# Patient Record
Sex: MALE | Race: WHITE | Employment: PART TIME | URBAN - METROPOLITAN AREA
[De-identification: names, ages, dates, MRNs, and addresses within clinical notes are randomized per-mention and may not be internally consistent; named-entity substitution may affect disease eponyms.]

---

## 2020-11-03 ENCOUNTER — OFFICE VISIT (OUTPATIENT)
Dept: FAMILY MEDICINE | Facility: CLINIC | Age: 30
End: 2020-11-03
Payer: COMMERCIAL

## 2020-11-03 VITALS
RESPIRATION RATE: 16 BRPM | SYSTOLIC BLOOD PRESSURE: 122 MMHG | HEART RATE: 59 BPM | BODY MASS INDEX: 20.33 KG/M2 | OXYGEN SATURATION: 100 % | WEIGHT: 142 LBS | HEIGHT: 70 IN | DIASTOLIC BLOOD PRESSURE: 76 MMHG | TEMPERATURE: 98.6 F

## 2020-11-03 DIAGNOSIS — Z23 NEED FOR PROPHYLACTIC VACCINATION AND INOCULATION AGAINST INFLUENZA: ICD-10-CM

## 2020-11-03 DIAGNOSIS — R10.2 PELVIC PAIN IN MALE: Primary | ICD-10-CM

## 2020-11-03 LAB
ALBUMIN UR-MCNC: NEGATIVE MG/DL
APPEARANCE UR: CLEAR
BILIRUB UR QL STRIP: NEGATIVE
COLOR UR AUTO: YELLOW
GLUCOSE UR STRIP-MCNC: NEGATIVE MG/DL
HGB UR QL STRIP: NEGATIVE
KETONES UR STRIP-MCNC: NEGATIVE MG/DL
LEUKOCYTE ESTERASE UR QL STRIP: NEGATIVE
NITRATE UR QL: NEGATIVE
PH UR STRIP: 8 PH (ref 5–7)
SOURCE: ABNORMAL
SP GR UR STRIP: 1.01 (ref 1–1.03)
UROBILINOGEN UR STRIP-ACNC: 0.2 EU/DL (ref 0.2–1)

## 2020-11-03 PROCEDURE — 90686 IIV4 VACC NO PRSV 0.5 ML IM: CPT | Performed by: PHYSICIAN ASSISTANT

## 2020-11-03 PROCEDURE — 87591 N.GONORRHOEAE DNA AMP PROB: CPT | Performed by: PHYSICIAN ASSISTANT

## 2020-11-03 PROCEDURE — 99203 OFFICE O/P NEW LOW 30 MIN: CPT | Mod: 25 | Performed by: PHYSICIAN ASSISTANT

## 2020-11-03 PROCEDURE — 87491 CHLMYD TRACH DNA AMP PROBE: CPT | Performed by: PHYSICIAN ASSISTANT

## 2020-11-03 PROCEDURE — 81003 URINALYSIS AUTO W/O SCOPE: CPT | Performed by: PHYSICIAN ASSISTANT

## 2020-11-03 PROCEDURE — 90471 IMMUNIZATION ADMIN: CPT | Performed by: PHYSICIAN ASSISTANT

## 2020-11-03 ASSESSMENT — MIFFLIN-ST. JEOR: SCORE: 1625.36

## 2020-11-03 NOTE — PROGRESS NOTES
Subjective     Ajay Duron is a 29 year old male who presents to clinic today for the following health issues:    HPI         Abdominal/Flank Pain  Onset/Duration: ongoing for over a year, noticed again about a month ago. Concern, dad passed recently due to prostate cancer. He was diagnosed at 61.    Description:   Character: discomfort, sharp pain lasting seconds. States sometimes feels like sitting on something but no external lesions currently- had hemorroid a few years ago.   Last year was having severe intermittent rectal pain.  This resolved.    Location: pelvic region, bladder,   Radiation: Back  Intensity: moderate  Progression of Symptoms:  worsening  Accompanying Signs & Symptoms:  Fever/Chills: no  Gas/Bloating: no  Nausea: no  Vomitting: no  Diarrhea: no, but  Will have formed stools 2-3 every morning.    Constipation: no  Dysuria or Hematuria: no  History:   Trauma: no  Previous similar pain: YES  Previous tests done: none, patient states seen about a year ago, and no scans or x ray done.   Precipitating factors:   Does the pain change with:     Food: no    Bowel Movement: no    Urination: no   Other factors:  no  Therapies tried and outcome: tylenol    Outputs intact.  Never had colonoscopy.  No testicular pain.   No rashes.                    Allergies   Allergen Reactions     Sulfa Drugs Hives and Rash       There is no problem list on file for this patient.      History reviewed. No pertinent past medical history.    No current outpatient medications on file prior to visit.  No current facility-administered medications on file prior to visit.       Social History     Tobacco Use     Smoking status: Never Smoker     Smokeless tobacco: Never Used   Substance Use Topics     Alcohol use: Yes     Drug use: Yes     Types: Marijuana       Family History   Problem Relation Age of Onset     Diabetes Mother      Hypertension Mother      Prostate Cancer Father      Hypertension Father      Asthma Brother  "         ROS:  General: negative for fever     GI: as above  : negative for dysuria   SKIN no rashes    OBJECTIVE:  /76 (BP Location: Left arm, Patient Position: Sitting, Cuff Size: Adult Regular)   Pulse 59   Temp 98.6  F (37  C) (Tympanic)   Resp 16   Ht 1.778 m (5' 10\")   Wt 64.4 kg (142 lb)   SpO2 100%   BMI 20.37 kg/m     General:   awake, alert, and cooperative.  NAD.   Head: Normocephalic, atraumatic.  Eyes: Conjunctiva clear, non icteric.   RESP: Regular rate  ABD: soft, no tenderness to palpation , no rigidity, guarding or rebound , bowel sounds intact  Neuro: Alert and oriented - normal speech.     ASSESSMENT:well appearing, will refer to URO pending labs    ICD-10-CM    1. Pelvic pain in male  R10.2 UA reflex to Microscopic and Culture     Chlamydia trachomatis PCR     Neisseria gonorrhoeae PCR   2. Need for prophylactic vaccination and inoculation against influenza  Z23 INFLUENZA VACCINE IM > 6 MONTHS VALENT IIV4 [60924]           PLAN:         Advised about symptoms which might herald more serious problems.                 "

## 2020-11-03 NOTE — PATIENT INSTRUCTIONS
At [facility], we strive to deliver an exceptional experience to you, every time we see you. If you receive a survey, please complete it as we do value your feedback.  If you have MyChart, you can expect to receive results automatically within 24 hours of their completion.  Your provider will send a note interpreting your results as well.   If you do not have MyChart, you should receive your results in about a week by mail.    Your care team:                            Family Medicine Internal Medicine   MD Ezequiel Lainez MD Shantel Branch-Fleming, MD Srinivasa Vaka, MD Katya Georgiev PA-C  Nancy Seay, APRN CNP    Carlos Aparicio, MD Pediatrics   Roland Zazueta, PAMairaC  Velvet Vega, CNP MD Becky Guerin APRN CNP   MD Dolores Quintero MD Deborah Mielke, MD Makenna Patterson, APRN CNP  Sonia Mina PAMairaC  aKrla Haines, CNP  MD Padmini Pham MD Angela Wermerskirchen, MD      Clinic hours: Monday - Thursday 7 am-7 pm; Fridays 7 am-5 pm.   Urgent care: Monday - Friday 11 am-9 pm; Saturday and Sunday 9 am-5 pm.    Clinic: [phone]       [facility]: Monday - Thursday 8 am - 7 pm; Friday 8 am - 6 pm  [facility]: [phone]     Use www.[email or web address].org for 24/7 diagnosis and treatment of dozens of conditions.    Patient Education     Abdominal Pain  Abdominal pain is pain in the stomach or belly area. Everyone has this pain from time to time. In many cases it goes away on its own. But abdominal pain can sometimes be due to a serious problem, such as appendicitis. So it s important to know when to get help.     Causes of abdominal pain   There are many possible causes of abdominal pain. Common causes in adults include:    Constipation, diarrhea, or gas    Stomach acid flowing back up into the esophagus (acid reflux or heartburn)    Severe acid reflux, called GERD  (gastroesophageal reflux disease)    A sore in the lining of the stomach or small intestine (peptic ulcer)    Inflammation of the gallbladder, liver, or pancreas    Gallstones or kidney stones    Appendicitis     Intestinal blockage     An internal organ pushing through a muscle or other tissue (hernia)    Urinary tract infections    In women, menstrual cramps, fibroids, ovarian cysts, pelvic inflammatory disease, or endometriosis    Inflammation or infection of the intestines, including Crohn's disease and ulcerative colitis    Irritable bowel syndrome  Diagnosing the cause of abdominal pain   Your healthcare provider will give you a physical exam help find the cause of your pain. If needed, you will have tests. Belly pain has many possible causes. So it can be hard to find the reason for your pain. Giving details about your pain can help. Tell your provider where and when you feel the pain, and what makes it better or worse. Also let your provider know if you have other symptoms such as:     Fever    Tiredness    Upset stomach (nausea)    Vomiting    Changes in bathroom habits    Blood in the stool or black, tarry stool    Weight loss that you can't explain (involuntary weight loss?)  Also report any family history of stomach or intestinal problems, or cancers. Tell your provider about all your alcohol use and drug use. Tell your provider about all medicines you use, including herbs, vitamins, and supplements.   Treating abdominal pain   Some causes of pain need emergency medical treatment right away. These include appendicitis or a bowel blockage. Other problems can be treated with rest, fluids, or medicines. Your healthcare provider can give you specific instructions for treatment or self-care based on what is causing your pain.      If you have vomiting or diarrhea, sip water or other clear fluids. When you are ready to eat solid foods again, start with small amounts of easy-to-digest, low-fat foods. These  include apple sauce, toast, or crackers.   When to get medical care   Call 911 or go to the hospital right away if you:     Can t pass stool and are vomiting    Are vomiting blood or have bloody diarrhea or black, tarry diarrhea    Have chest, neck, or shoulder pain    Feel like you might pass out    Have pain in your shoulder blades with nausea    Have sudden, severe belly pain    Have new, severe pain unlike any you have felt before    Have a belly that is rigid, hard, and hurts to touch  Call your healthcare provider if you have:     Pain for more than 5 days    Bloating for more than 2 days    Diarrhea for more than 5 days    A fever of 100.4 F (38 C) or higher, or as directed by your healthcare provider    Pain that gets worse    Weight loss for no reason    Continued lack of appetite    Blood in your stool  How to prevent abdominal pain   Here are some tips to help prevent abdominal pain:     Eat smaller amounts of food at each meal.    Don't eat greasy, fried, or other high-fat foods.    Don't eat foods that give you gas.    Exercise regularly.    Drink plenty of fluids.  To help prevent GERD symptoms:     Quit smoking.    Reduce alcohol and foods that increase stomach acid.    Don't use aspirin or over-the-counter pain and fever medicines, if possible. This includes nonsteroidal anti-inflammatory drugs (NSAIDs).    Lose excess weight.    Finish eating at least 2 hours before you go to bed or lie down.    Raise the head of your bed.  [employer] last reviewed this educational content on 4/1/2019 2000-2020 [employer]. [address] [place] [address], [place] [address]. All rights reserved. This information is not intended as a substitute for professional medical care. Always follow your healthcare professional's instructions.

## 2020-11-04 ENCOUNTER — TELEPHONE (OUTPATIENT)
Dept: FAMILY MEDICINE | Facility: CLINIC | Age: 30
End: 2020-11-04

## 2020-11-04 DIAGNOSIS — R10.2 PELVIC PAIN IN MALE: Primary | ICD-10-CM

## 2020-11-04 LAB
C TRACH DNA SPEC QL NAA+PROBE: NEGATIVE
N GONORRHOEA DNA SPEC QL NAA+PROBE: NEGATIVE
SPECIMEN SOURCE: NORMAL
SPECIMEN SOURCE: NORMAL

## 2020-11-06 NOTE — TELEPHONE ENCOUNTER
MEDICAL RECORDS REQUEST   [facility]  Urology Clinic  [address] [place] [address]  PHONE: [phone]  Fax: [phone]        FUTURE VISIT INFORMATION                                                   Ajay Duron, : 1990 scheduled for future visit at [facility]    APPOINTMENT INFORMATION:    Date: 11/10/2020    Provider:  СВЕТЛАНА HERNANDEZ    Reason for Visit/Diagnosis: PELVIC PAIN    REFERRAL INFORMATION:    Referring provider:  RADHA CLANCY    Specialty: PA    Referring providers clinic:  [facility] contact number:  [phone]    RECORDS REQUESTED FOR VISIT                                                     NOTES  STATUS/DETAILS   OFFICE NOTE from referring provider  yes   2020   OFFICE NOTE from other specialist  no   DISCHARGE SUMMARY from hospital  no   DISCHARGE REPORT from the ER  no   OPERATIVE REPORT  no   MEDICATION LIST  yes   LABS     URINALYSIS (UA)  yes   2020     PRE-VISIT CHECKLIST      Record collection complete Yes   Appointment appropriately scheduled           (right time/right provider) Yes   MyChart activation Yes   Questionnaire complete If no, please explain IN PROCESS     Completed by: Josephine Uribe

## 2020-11-10 ENCOUNTER — PRE VISIT (OUTPATIENT)
Dept: UROLOGY | Facility: CLINIC | Age: 30
End: 2020-11-10

## 2020-11-10 ENCOUNTER — VIRTUAL VISIT (OUTPATIENT)
Dept: UROLOGY | Facility: CLINIC | Age: 30
End: 2020-11-10
Attending: PHYSICIAN ASSISTANT
Payer: COMMERCIAL

## 2020-11-10 DIAGNOSIS — Z80.42 FAMILY HISTORY OF PROSTATE CANCER: ICD-10-CM

## 2020-11-10 DIAGNOSIS — R10.2 PELVIC PAIN IN MALE: Primary | ICD-10-CM

## 2020-11-10 DIAGNOSIS — K62.89 RECTAL PAIN: ICD-10-CM

## 2020-11-10 DIAGNOSIS — N41.0 PROSTATITIS, ACUTE: ICD-10-CM

## 2020-11-10 PROCEDURE — 99203 OFFICE O/P NEW LOW 30 MIN: CPT | Mod: 95 | Performed by: PHYSICIAN ASSISTANT

## 2020-11-10 RX ORDER — LEVOFLOXACIN 500 MG/1
500 TABLET, FILM COATED ORAL DAILY
Qty: 10 TABLET | Refills: 0 | Status: SHIPPED | OUTPATIENT
Start: 2020-11-10 | End: 2021-08-03

## 2020-11-10 NOTE — PATIENT INSTRUCTIONS
UROLOGY CLINIC VISIT PATIENT INSTRUCTIONS    Someone will contact you to schedule a lab appointment for a blood draw (PSA).     Start taking the antibiotic (levofloxacin) 500 mg once daily for 10 days.    Continue to take ibuprofen as needed and use a donut pillow when seated for prolonged periods of time to relieve pressure on the area.     Send an update through [employer] after you complete antibiotics to let me know how things are going. If you do not have improvement, we will consider referring you to colorectal surgery.     If you have any issues, questions or concerns in the meantime, do not hesitate to contact us at [phone] or via [employer].     It was a pleasure meeting with you today.  Thank you for allowing me and my team the privilege of caring for you today.  YOU are the reason we are here, and I truly hope we provided you with the excellent service you deserve.  Please let us know if there is anything else we can do for you so that we can be sure you are leaving completely satisfied with your care experience.

## 2020-11-10 NOTE — LETTER
"11/10/2020       RE: Ajay Duron  [address]  [facility] [address]     Dear Colleague,    Thank you for referring your patient, Ajay Duron, to the [facility]OLOGY [facility] [place] at [facility]. Please see a copy of my visit note below.    Ajay Duron is a 30 year old male who is being evaluated via a billable video visit.      The patient has been notified of following:     \"This video visit will be conducted via a call between you and your physician/provider. We have found that certain health care needs can be provided without the need for an in-person physical exam.  This service lets us provide the care you need with a video conversation.  If a prescription is necessary we can send it directly to your pharmacy.  If lab work is needed we can place an order for that and you can then stop by our lab to have the test done at a later time.    Video visits are billed at different rates depending on your insurance coverage.  Please reach out to your insurance provider with any questions.    If during the course of the call the physician/provider feels a video visit is not appropriate, you will not be charged for this service.\"    Patient has given verbal consent for Video visit? Yes  How would you like to obtain your AVS? MyChart  If you are dropped from the video visit, the video invite should be resent to: Send to e-mail at: [email or web address]  Will anyone else be joining your video visit? No      Urology Virtual Visit - Consultation    Name: Ajay Duron    MRN: [id number]   YOB: 1990                 Chief Complaint:   Pelvic pain          History of Present Illness:   Mr. Ajay Duron is a 30 year old male seen in consultation from Dontae Zazueta PA-C for evaluation of pelvic pain. About 1 year ago, he experienced stabbing rectal pains that eventually resolved. Noticed similar symptoms about 1 month ago. He " "describes brief, sharp \"pinching\" pain that lasts for \"a split second\" and then resolves. He feels this in the rectal area primarily, but also describes a feeling of \"tightness\" in the bladder. These symptoms wax and wane with some good days and some bad days. Previously, he also had a sensation like he was sitting on something, but this has since improved. 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 Recently bought a new stool that helps to relieve pressure on this area which has helped some. Describes a history of external hemorrhoids a few years ago, but has not felt any hemorrhoids or other lumps associated with current symptoms.     He denies any pain with urination, gross hematuria, change in the urinary stream, hesitancy, feeling of incomplete bladder emptying, or needing to strain to void. Also denies pain with defecation or blood in the stool. Notes that he has been drinking more water and stopped drinking coffee a few weeks ago. No concern for STI's and had negative gonorrhea/chlamydia testing last week. Urinalysis at that time was also negative.    Also of note, patient's father recently passed from prostate cancer. He was diagnosed in his early 60's. Patient notes that he has been worried about the possibility that his symptoms represent something similar.          Past Medical History:   No past medical history on file.         Past Surgical History:   No past surgical history on file.         Social History:     Social History     Tobacco Use     Smoking status: Never Smoker     Smokeless tobacco: Never Used   Substance Use Topics     Alcohol use: Yes            Family History:     Family History   Problem Relation Age of Onset     Diabetes Mother      Hypertension Mother      Prostate Cancer Father      Hypertension Father      Asthma Brother               Allergies:     Allergies   Allergen Reactions     Sulfa Drugs Hives and Rash            Medications:     Current Outpatient Medications "   Medication Sig     levofloxacin (LEVAQUIN) 500 MG tablet Take 1 tablet (500 mg) by mouth daily     No current facility-administered medications for this visit.              Review of Systems:    ROS: 14 point ROS neg other than the symptoms noted above in the HPI and PMH.          Physical Exam:   GENERAL: Healthy, alert and no distress  EYES: Eyes grossly normal to inspection.  No discharge or erythema, or obvious scleral/conjunctival abnormalities.  RESP: No audible wheeze, cough, or visible cyanosis.  No visible retractions or increased work of breathing.    SKIN: Visible skin clear. No significant rash, abnormal pigmentation or lesions.  NEURO: Cranial nerves grossly intact.  Mentation and speech appropriate for age.  PSYCH: Mentation appears normal, affect normal/bright, judgement and insight intact, normal speech and appearance well-groomed.         Labs:    All laboratory data reviewed with patient  Significant for     Color Urine (no units)   Date Value   11/03/2020 Yellow     Appearance Urine (no units)   Date Value   11/03/2020 Clear     Glucose Urine (mg/dL)   Date Value   11/03/2020 Negative     Bilirubin Urine (no units)   Date Value   11/03/2020 Negative     Ketones Urine (mg/dL)   Date Value   11/03/2020 Negative     Specific Gravity Urine (no units)   Date Value   11/03/2020 1.015     pH Urine (pH)   Date Value   11/03/2020 8.0 (H)     Protein Albumin Urine (mg/dL)   Date Value   11/03/2020 Negative     Urobilinogen Urine (EU/dL)   Date Value   11/03/2020 0.2     Nitrite Urine (no units)   Date Value   11/03/2020 Negative     Leukocyte Esterase Urine (no units)   Date Value   11/03/2020 Negative       Gonorrhea/chlamydia   Negative   11/3/2020      Imaging:    None         Assessment and Plan:   30 year old male with a history of hemorrhoids, intermittent rectal pain, and pelvic pain. Possible differentials may include hemorrhoids (internal vs external), anal fissure, prostatitis, pelvic floor  myofascial pain/dysfunction, vs. other. Discussed management options including further evaluation with exam (JAYNA) in clinic, empiric treatment for possible prostatitis, referral to colorectal surgery, and/or PSA testing for reassurance given family history of prostate cancer. After discussion, plan is for the following:  -PSA  -Levofloxacin 500 mg once daily x 10 days. Side effects discussed.  -If no improvement, consider referral to colorectal surgery vs. further evaluation in clinic. Another consideration could be referral to pelvic floor physical therapy if no obvious urologic or colorectal etiology for symptoms is found.     Yary Page PA-C  November 10, 2020       Video-Visit Details    Type of service:  Video Visit    Video Start Time: 9:54 AM  Video End Time: 10:15 AM    Originating Location (pt. Location): Home    Distant Location (provider location):  [facility] [place]     Platform used for Video Visit: [employer]    Yary Page PA-C

## 2020-11-10 NOTE — PROGRESS NOTES
"Ajay Duron is a 30 year old male who is being evaluated via a billable video visit.      The patient has been notified of following:     \"This video visit will be conducted via a call between you and your physician/provider. We have found that certain health care needs can be provided without the need for an in-person physical exam.  This service lets us provide the care you need with a video conversation.  If a prescription is necessary we can send it directly to your pharmacy.  If lab work is needed we can place an order for that and you can then stop by our lab to have the test done at a later time.    Video visits are billed at different rates depending on your insurance coverage.  Please reach out to your insurance provider with any questions.    If during the course of the call the physician/provider feels a video visit is not appropriate, you will not be charged for this service.\"    Patient has given verbal consent for Video visit? Yes  How would you like to obtain your AVS? MyChart  If you are dropped from the video visit, the video invite should be resent to: Send to e-mail at: [email or web address]  Will anyone else be joining your video visit? No      Urology Virtual Visit - Consultation    Name: Ajay Duron    MRN: [id number]   YOB: 1990                 Chief Complaint:   Pelvic pain          History of Present Illness:   Mr. Ajay Duron is a 30 year old male seen in consultation from Dontae Zazueta PA-C for evaluation of pelvic pain. About 1 year ago, he experienced stabbing rectal pains that eventually resolved. Noticed similar symptoms about 1 month ago. He describes brief, sharp \"pinching\" pain that lasts for \"a split second\" and then resolves. He feels this in the rectal area primarily, but also describes a feeling of \"tightness\" in the bladder. These symptoms wax and wane with some good days and some bad days. Previously, he also had a sensation like he was " sitting on something, but this has since improved. 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 Recently bought a new stool that helps to relieve pressure on this area which has helped some. Describes a history of external hemorrhoids a few years ago, but has not felt any hemorrhoids or other lumps associated with current symptoms.     He denies any pain with urination, gross hematuria, change in the urinary stream, hesitancy, feeling of incomplete bladder emptying, or needing to strain to void. Also denies pain with defecation or blood in the stool. Notes that he has been drinking more water and stopped drinking coffee a few weeks ago. No concern for STI's and had negative gonorrhea/chlamydia testing last week. Urinalysis at that time was also negative.    Also of note, patient's father recently passed from prostate cancer. He was diagnosed in his early 60's. Patient notes that he has been worried about the possibility that his symptoms represent something similar.          Past Medical History:   No past medical history on file.         Past Surgical History:   No past surgical history on file.         Social History:     Social History     Tobacco Use     Smoking status: Never Smoker     Smokeless tobacco: Never Used   Substance Use Topics     Alcohol use: Yes            Family History:     Family History   Problem Relation Age of Onset     Diabetes Mother      Hypertension Mother      Prostate Cancer Father      Hypertension Father      Asthma Brother               Allergies:     Allergies   Allergen Reactions     Sulfa Drugs Hives and Rash            Medications:     Current Outpatient Medications   Medication Sig     levofloxacin (LEVAQUIN) 500 MG tablet Take 1 tablet (500 mg) by mouth daily     No current facility-administered medications for this visit.              Review of Systems:    ROS: 14 point ROS neg other than the symptoms noted above in the HPI and PMH.          Physical Exam:   GENERAL:  Healthy, alert and no distress  EYES: Eyes grossly normal to inspection.  No discharge or erythema, or obvious scleral/conjunctival abnormalities.  RESP: No audible wheeze, cough, or visible cyanosis.  No visible retractions or increased work of breathing.    SKIN: Visible skin clear. No significant rash, abnormal pigmentation or lesions.  NEURO: Cranial nerves grossly intact.  Mentation and speech appropriate for age.  PSYCH: Mentation appears normal, affect normal/bright, judgement and insight intact, normal speech and appearance well-groomed.         Labs:    All laboratory data reviewed with patient  Significant for     Color Urine (no units)   Date Value   11/03/2020 Yellow     Appearance Urine (no units)   Date Value   11/03/2020 Clear     Glucose Urine (mg/dL)   Date Value   11/03/2020 Negative     Bilirubin Urine (no units)   Date Value   11/03/2020 Negative     Ketones Urine (mg/dL)   Date Value   11/03/2020 Negative     Specific Gravity Urine (no units)   Date Value   11/03/2020 1.015     pH Urine (pH)   Date Value   11/03/2020 8.0 (H)     Protein Albumin Urine (mg/dL)   Date Value   11/03/2020 Negative     Urobilinogen Urine (EU/dL)   Date Value   11/03/2020 0.2     Nitrite Urine (no units)   Date Value   11/03/2020 Negative     Leukocyte Esterase Urine (no units)   Date Value   11/03/2020 Negative       Gonorrhea/chlamydia   Negative   11/3/2020      Imaging:    None         Assessment and Plan:   30 year old male with a history of hemorrhoids, intermittent rectal pain, and pelvic pain. Possible differentials may include hemorrhoids (internal vs external), anal fissure, prostatitis, pelvic floor myofascial pain/dysfunction, vs. other. Discussed management options including further evaluation with exam (JAYNA) in clinic, empiric treatment for possible prostatitis, referral to colorectal surgery, and/or PSA testing for reassurance given family history of prostate cancer. After discussion, plan is for the  following:  -PSA  -Levofloxacin 500 mg once daily x 10 days. Side effects discussed.  -If no improvement, consider referral to colorectal surgery vs. further evaluation in clinic. Another consideration could be referral to pelvic floor physical therapy if no obvious urologic or colorectal etiology for symptoms is found.     Yary Page PA-C  November 10, 2020       Video-Visit Details    Type of service:  Video Visit    Video Start Time: 9:54 AM  Video End Time: 10:15 AM    Originating Location (pt. Location): Home    Distant Location (provider location):  [facility]OLOGY [facility]IN[facility] [place]     Platform used for Video Visit: [employer]    Yary Page PA-C

## 2020-11-16 ENCOUNTER — TELEPHONE (OUTPATIENT)
Dept: UROLOGY | Facility: CLINIC | Age: 30
End: 2020-11-16

## 2020-11-16 NOTE — TELEPHONE ENCOUNTER
Left voice message with lab number to call to schedule lab appointment for a blood draw (PSA) per Yary Page last virtual visit on 11/10/20.

## 2020-11-23 DIAGNOSIS — N41.0 PROSTATITIS, ACUTE: ICD-10-CM

## 2020-11-23 LAB — PSA SERPL-MCNC: 0.65 UG/L (ref 0–4)

## 2020-11-23 PROCEDURE — 36415 COLL VENOUS BLD VENIPUNCTURE: CPT | Performed by: PATHOLOGY

## 2020-11-23 PROCEDURE — 84153 ASSAY OF PSA TOTAL: CPT | Performed by: PATHOLOGY

## 2021-01-09 ENCOUNTER — HEALTH MAINTENANCE LETTER (OUTPATIENT)
Age: 31
End: 2021-01-09

## 2021-07-11 ENCOUNTER — MYC MEDICAL ADVICE (OUTPATIENT)
Dept: UROLOGY | Facility: CLINIC | Age: 31
End: 2021-07-11

## 2021-07-11 DIAGNOSIS — R10.2 PELVIC PAIN IN MALE: Primary | ICD-10-CM

## 2021-07-11 DIAGNOSIS — N41.0 PROSTATITIS, ACUTE: ICD-10-CM

## 2021-07-13 ENCOUNTER — MYC MEDICAL ADVICE (OUTPATIENT)
Dept: UROLOGY | Facility: CLINIC | Age: 31
End: 2021-07-13

## 2021-07-13 NOTE — TELEPHONE ENCOUNTER
[facility] Call Center    Phone Message    May a detailed message be left on voicemail: yes     Reason for Call: Order(s): Other:   Reason for requested: Pt called to schedule a lab appointment but there are no orders placed as of yet.  He is scheduled for tomorrow (7/14) at 5pm.  Date needed: as soon as possible  Provider name: Yary Page      Action Taken: Message routed to:  [facility] ([facility]):  Urology    Travel Screening: Not Applicable

## 2021-07-13 NOTE — PROGRESS NOTES
See duplicate encounter.  Tosha Boyle LPN  Urology Clinic Service   [facility]

## 2021-07-14 ENCOUNTER — LAB (OUTPATIENT)
Dept: LAB | Facility: CLINIC | Age: 31
End: 2021-07-14
Attending: PHYSICIAN ASSISTANT
Payer: COMMERCIAL

## 2021-07-14 DIAGNOSIS — R10.2 PELVIC PAIN IN MALE: ICD-10-CM

## 2021-07-14 DIAGNOSIS — N41.0 PROSTATITIS, ACUTE: ICD-10-CM

## 2021-07-14 LAB
ALBUMIN UR-MCNC: NEGATIVE MG/DL
APPEARANCE UR: CLEAR
BILIRUB UR QL STRIP: NEGATIVE
COLOR UR AUTO: NORMAL
GLUCOSE UR STRIP-MCNC: NEGATIVE MG/DL
HGB UR QL STRIP: NEGATIVE
KETONES UR STRIP-MCNC: NEGATIVE MG/DL
LEUKOCYTE ESTERASE UR QL STRIP: NEGATIVE
NITRATE UR QL: NEGATIVE
PH UR STRIP: 7 [PH] (ref 5–7)
RBC URINE: 0 /HPF
SP GR UR STRIP: 1 (ref 1–1.03)
UROBILINOGEN UR STRIP-MCNC: NORMAL MG/DL
WBC URINE: 0 /HPF

## 2021-07-14 PROCEDURE — 87086 URINE CULTURE/COLONY COUNT: CPT | Performed by: PHYSICIAN ASSISTANT

## 2021-07-14 PROCEDURE — 81001 URINALYSIS AUTO W/SCOPE: CPT | Performed by: PATHOLOGY

## 2021-07-14 NOTE — PROGRESS NOTES
Called patient and left message orders have been placed for your lab appointment today by amarilys Lopes LPN Staff Nurse

## 2021-07-15 LAB — BACTERIA UR CULT: NO GROWTH

## 2021-07-22 ENCOUNTER — PRE VISIT (OUTPATIENT)
Dept: UROLOGY | Facility: CLINIC | Age: 31
End: 2021-07-22

## 2021-07-29 NOTE — PROGRESS NOTES
"Urology Virtual Visit - Follow Up    Name: Ajay Duron    MRN: [id number]   YOB: 1990                 Chief Complaint:   Pelvic pain         Assessment and Plan:   30 year old male with intermittent pelvic / lower abdominal pain. Treated for prostatitis in the Fall of 2020 with symptomatic improvement. Now with intermittent dysuria and sharp lower abdominal pain, last occurring a few weeks ago. Recent UA/UC completely negative making infection unlikely. Other differentials may include urolithiasis, pelvic floor myofascial dysfunction, constipation or other GI pathology, appendicitis, hernia, painful bladder syndrome, chronic prostatitis, versus other. Although he is currently asymptomatic, we discussed obtaining a CT abdomen/pelvis to further evaluate. Patient is amenable and would like to proceed.  -CT A/P w/o contrast next available.   -Continue to limit or avoid caffeine and other bladder irritants.  -Follow up pending CT results or if symptoms recur. Future considerations may include referral to pelvic floor PT versus cystoscopy.     Yary Page PA-C  August 3, 2021          History of Present Illness:   Ajay Duron is a 30 year old male seen today via virtual visit for follow up of pelvic pain. Seen in initial consultation (virtually) on 11/10/2020. History from this date as follows:    About 1 year ago, he experienced stabbing rectal pains that eventually resolved. Noticed similar symptoms about 1 month ago. He describes brief, sharp \"pinching\" pain that lasts for \"a split second\" and then resolves. He feels this in the rectal area primarily, but also describes a feeling of \"tightness\" in the bladder. These symptoms wax and wane with some good days and some bad days. Previously, he also had a sensation like he was sitting on something, but this has since improved. 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 Recently bought a new stool that helps to relieve pressure on this " area which has helped some. Describes a history of external hemorrhoids a few years ago, but has not felt any hemorrhoids or other lumps associated with current symptoms.     He denies any pain with urination, gross hematuria, change in the urinary stream, hesitancy, feeling of incomplete bladder emptying, or needing to strain to void. Also denies pain with defecation or blood in the stool. Notes that he has been drinking more water and stopped drinking coffee a few weeks ago. No concern for STI's and had negative gonorrhea/chlamydia testing last week. Urinalysis at that time was also negative.    Also of note, patient's father recently passed from prostate cancer. He was diagnosed in his early 60's. Patient notes that he has been worried about the possibility that his symptoms represent something similar.     He had PSA checked on 11/23/2020 which was low at 0.65. Treated with a 10 day course of empiric Levaquin for possible prostatitis with almost complete symptom resolution. However, he continued to have intermittent sharp pains in his bladder/pelvis which worsened in early July 2021. UA/UC on 7/14/21 were completely negative. Today, he reports that his last bout of pain was a few weeks ago. He was standing in the bathroom when out of the blue he developed sharp 8/10 pain in his lower abdomen. This lasted for ~30 minutes and then resolved. No blood in the urine, issues with constipation or diarrhea, or other pertinent symptoms. He has no history of kidney stones. Since then, his symptoms have improved and he feels relatively normal today.    Of note, he did start a job that requires a lot of physical labor recently. He has also stopped drinking caffeine which he thinks has helped.         Past Medical History:   No past medical history on file.         Past Surgical History:   No past surgical history on file.         Social History:     Social History     Tobacco Use     Smoking status: Never Smoker      Smokeless tobacco: Never Used   Substance Use Topics     Alcohol use: Yes            Family History:     Family History   Problem Relation Age of Onset     Diabetes Mother      Hypertension Mother      Prostate Cancer Father      Hypertension Father      Asthma Brother               Allergies:     Allergies   Allergen Reactions     Sulfa Drugs Hives and Rash            Medications:     Current Outpatient Medications   Medication Sig     levofloxacin (LEVAQUIN) 500 MG tablet Take 1 tablet (500 mg) by mouth daily     No current facility-administered medications for this visit.             Review of Systems:    ROS: 14 point ROS neg other than the symptoms noted above in the HPI and PMH.          Physical Exam:   GENERAL: Healthy, alert and no distress  EYES: Eyes grossly normal to inspection.  No discharge or erythema, or obvious scleral/conjunctival abnormalities.  RESP: No audible wheeze, cough, or visible cyanosis.  No visible retractions or increased work of breathing.    SKIN: Visible skin clear. No significant rash, abnormal pigmentation or lesions.  NEURO: Cranial nerves grossly intact.  Mentation and speech appropriate for age.  PSYCH: Mentation appears normal, affect normal/bright, judgement and insight intact, normal speech and appearance well-groomed.       Labs:      Color Urine (no units)   Date Value   07/14/2021 Straw   11/03/2020 Yellow     Appearance Urine (no units)   Date Value   07/14/2021 Clear   11/03/2020 Clear     Glucose Urine (mg/dL)   Date Value   07/14/2021 Negative   11/03/2020 Negative     Bilirubin Urine (no units)   Date Value   07/14/2021 Negative   11/03/2020 Negative     Ketones Urine (mg/dL)   Date Value   07/14/2021 Negative   11/03/2020 Negative     Specific Gravity Urine (no units)   Date Value   07/14/2021 1.005   11/03/2020 1.015     pH Urine   Date Value   07/14/2021 7.0   11/03/2020 8.0 pH (H)     Protein Albumin Urine (mg/dL)   Date Value   07/14/2021 Negative   11/03/2020  Negative     Urobilinogen Urine (EU/dL)   Date Value   11/03/2020 0.2     Nitrite Urine (no units)   Date Value   07/14/2021 Negative   11/03/2020 Negative     Leukocyte Esterase Urine (no units)   Date Value   07/14/2021 Negative   11/03/2020 Negative       Gonorrhea/chlamydia   Negative   11/3/2020      Imaging:    None         Video Start Time: 7:02 AM  Video-Visit Details    Type of service:  Video Visit    Video End Time:7:12 AM    Originating Location (pt. Location): Home    Distant Location (provider location):  [facility]OLOGY [facility] [place]     Platform used for Video Visit: [employer]

## 2021-08-03 ENCOUNTER — VIRTUAL VISIT (OUTPATIENT)
Dept: UROLOGY | Facility: CLINIC | Age: 31
End: 2021-08-03
Payer: COMMERCIAL

## 2021-08-03 DIAGNOSIS — R10.2 PELVIC PAIN IN MALE: Primary | ICD-10-CM

## 2021-08-03 PROCEDURE — 99213 OFFICE O/P EST LOW 20 MIN: CPT | Mod: GT | Performed by: PHYSICIAN ASSISTANT

## 2021-08-03 NOTE — LETTER
"8/3/2021       RE: Ajay Duron  [address]  [place] [facility] [address]     Dear Colleague,    Thank you for referring your patient, Ajay Duron, to the [facility]OLOG[facility] [place] [facility]. Please see a copy of my visit note below.    Urology Virtual Visit - Follow Up    Name: Ajay Duron    MRN: [id number]   YOB: 1990                 Chief Complaint:   Pelvic pain         Assessment and Plan:   30 year old male with intermittent pelvic / lower abdominal pain. Treated for prostatitis in the Fall of 2020 with symptomatic improvement. Now with intermittent dysuria and sharp lower abdominal pain, last occurring a few weeks ago. Recent UA/UC completely negative making infection unlikely. Other differentials may include urolithiasis, pelvic floor myofascial dysfunction, constipation or other GI pathology, appendicitis, hernia, painful bladder syndrome, chronic prostatitis, versus other. Although he is currently asymptomatic, we discussed obtaining a CT abdomen/pelvis to further evaluate. Patient is amenable and would like to proceed.  -CT A/P w/o contrast next available.   -Continue to limit or avoid caffeine and other bladder irritants.  -Follow up pending CT results or if symptoms recur. Future considerations may include referral to pelvic floor PT versus cystoscopy.     Yary Page PA-C  August 3, 2021          History of Present Illness:   Ajay Duron is a 30 year old male seen today via virtual visit for follow up of pelvic pain. Seen in initial consultation (virtually) on 11/10/2020. History from this date as follows:    About 1 year ago, he experienced stabbing rectal pains that eventually resolved. Noticed similar symptoms about 1 month ago. He describes brief, sharp \"pinching\" pain that lasts for \"a split second\" and then resolves. He feels this in the rectal area primarily, but also describes a " "feeling of \"tightness\" in the bladder. These symptoms wax and wane with some good days and some bad days. Previously, he also had a sensation like he was sitting on something, but this has since improved. 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 Recently bought a new stool that helps to relieve pressure on this area which has helped some. Describes a history of external hemorrhoids a few years ago, but has not felt any hemorrhoids or other lumps associated with current symptoms.     He denies any pain with urination, gross hematuria, change in the urinary stream, hesitancy, feeling of incomplete bladder emptying, or needing to strain to void. Also denies pain with defecation or blood in the stool. Notes that he has been drinking more water and stopped drinking coffee a few weeks ago. No concern for STI's and had negative gonorrhea/chlamydia testing last week. Urinalysis at that time was also negative.    Also of note, patient's father recently passed from prostate cancer. He was diagnosed in his early 60's. Patient notes that he has been worried about the possibility that his symptoms represent something similar.     He had PSA checked on 11/23/2020 which was low at 0.65. Treated with a 10 day course of empiric Levaquin for possible prostatitis with almost complete symptom resolution. However, he continued to have intermittent sharp pains in his bladder/pelvis which worsened in early July 2021. UA/UC on 7/14/21 were completely negative. Today, he reports that his last bout of pain was a few weeks ago. He was standing in the bathroom when out of the blue he developed sharp 8/10 pain in his lower abdomen. This lasted for ~30 minutes and then resolved. No blood in the urine, issues with constipation or diarrhea, or other pertinent symptoms. He has no history of kidney stones. Since then, his symptoms have improved and he feels relatively normal today.    Of note, he did start a job that requires a lot of physical " labor recently. He has also stopped drinking caffeine which he thinks has helped.         Past Medical History:   No past medical history on file.         Past Surgical History:   No past surgical history on file.         Social History:     Social History     Tobacco Use     Smoking status: Never Smoker     Smokeless tobacco: Never Used   Substance Use Topics     Alcohol use: Yes            Family History:     Family History   Problem Relation Age of Onset     Diabetes Mother      Hypertension Mother      Prostate Cancer Father      Hypertension Father      Asthma Brother               Allergies:     Allergies   Allergen Reactions     Sulfa Drugs Hives and Rash            Medications:     Current Outpatient Medications   Medication Sig     levofloxacin (LEVAQUIN) 500 MG tablet Take 1 tablet (500 mg) by mouth daily     No current facility-administered medications for this visit.             Review of Systems:    ROS: 14 point ROS neg other than the symptoms noted above in the HPI and PMH.          Physical Exam:   GENERAL: Healthy, alert and no distress  EYES: Eyes grossly normal to inspection.  No discharge or erythema, or obvious scleral/conjunctival abnormalities.  RESP: No audible wheeze, cough, or visible cyanosis.  No visible retractions or increased work of breathing.    SKIN: Visible skin clear. No significant rash, abnormal pigmentation or lesions.  NEURO: Cranial nerves grossly intact.  Mentation and speech appropriate for age.  PSYCH: Mentation appears normal, affect normal/bright, judgement and insight intact, normal speech and appearance well-groomed.       Labs:      Color Urine (no units)   Date Value   07/14/2021 Straw   11/03/2020 Yellow     Appearance Urine (no units)   Date Value   07/14/2021 Clear   11/03/2020 Clear     Glucose Urine (mg/dL)   Date Value   07/14/2021 Negative   11/03/2020 Negative     Bilirubin Urine (no units)   Date Value   07/14/2021 Negative   11/03/2020 Negative     Ketones  Urine (mg/dL)   Date Value   07/14/2021 Negative   11/03/2020 Negative     Specific Gravity Urine (no units)   Date Value   07/14/2021 1.005   11/03/2020 1.015     pH Urine   Date Value   07/14/2021 7.0   11/03/2020 8.0 pH (H)     Protein Albumin Urine (mg/dL)   Date Value   07/14/2021 Negative   11/03/2020 Negative     Urobilinogen Urine (EU/dL)   Date Value   11/03/2020 0.2     Nitrite Urine (no units)   Date Value   07/14/2021 Negative   11/03/2020 Negative     Leukocyte Esterase Urine (no units)   Date Value   07/14/2021 Negative   11/03/2020 Negative       Gonorrhea/chlamydia   Negative   11/3/2020      Imaging:    None         Video Start Time: 7:02 AM  Video-Visit Details    Type of service:  Video Visit    Video End Time:7:12 AM    Originating Location (pt. Location): Home    Distant Location (provider location):  [facility] UROLOG[facility]     Platform used for Video Visit: Alva Moss is a 30 year old who is being evaluated via a billable video visit.      How would you like to obtain your AVS? MyChart  If the video visit is dropped, the invitation should be resent by: Text to cell phone: [phone]  Will anyone else be joining your video visit? No

## 2021-08-03 NOTE — PROGRESS NOTES
Ajay is a 30 year old who is being evaluated via a billable video visit.      How would you like to obtain your AVS? [email or web address]  If the video visit is dropped, the invitation should be resent by: Text to cell phone: [phone]  Will anyone else be joining your video visit? No

## 2021-08-03 NOTE — PATIENT INSTRUCTIONS
UROLOGY CLINIC VISIT PATIENT INSTRUCTIONS    CT scan of the abdomen and pelvis next available.    Continue to limit caffeine and other bladder irritants.    If you have any issues, questions or concerns in the meantime, do not hesitate to contact us at [phone] or via [employer].     It was a pleasure meeting with you today.  Thank you for allowing me and my team the privilege of caring for you today.  YOU are the reason we are here, and I truly hope we provided you with the excellent service you deserve.  Please let us know if there is anything else we can do for you so that we can be sure you are leaving completely satisfied with your care experience.

## 2021-08-03 NOTE — NURSING NOTE
Chief Complaint   Patient presents with     RECHECK     Pelvic pain follow up     Spring Peña, CMA

## 2021-08-04 ENCOUNTER — TELEPHONE (OUTPATIENT)
Dept: UROLOGY | Facility: CLINIC | Age: 31
End: 2021-08-04

## 2021-08-09 ENCOUNTER — ANCILLARY PROCEDURE (OUTPATIENT)
Dept: CT IMAGING | Facility: CLINIC | Age: 31
End: 2021-08-09
Attending: PHYSICIAN ASSISTANT
Payer: COMMERCIAL

## 2021-08-09 DIAGNOSIS — R10.2 PELVIC PAIN IN MALE: ICD-10-CM

## 2021-08-09 PROCEDURE — 74176 CT ABD & PELVIS W/O CONTRAST: CPT | Mod: GC | Performed by: RADIOLOGY

## 2021-10-11 ENCOUNTER — HEALTH MAINTENANCE LETTER (OUTPATIENT)
Age: 31
End: 2021-10-11

## 2022-01-26 ENCOUNTER — OFFICE VISIT (OUTPATIENT)
Dept: FAMILY MEDICINE | Facility: CLINIC | Age: 32
End: 2022-01-26
Payer: COMMERCIAL

## 2022-01-26 VITALS
SYSTOLIC BLOOD PRESSURE: 113 MMHG | DIASTOLIC BLOOD PRESSURE: 65 MMHG | WEIGHT: 140.5 LBS | BODY MASS INDEX: 20.16 KG/M2 | HEART RATE: 66 BPM | TEMPERATURE: 97.9 F | RESPIRATION RATE: 18 BRPM | OXYGEN SATURATION: 99 %

## 2022-01-26 DIAGNOSIS — H93.11 TINNITUS, RIGHT EAR: Primary | ICD-10-CM

## 2022-01-26 DIAGNOSIS — R42 VERTIGO: ICD-10-CM

## 2022-01-26 PROCEDURE — 99213 OFFICE O/P EST LOW 20 MIN: CPT | Performed by: INTERNAL MEDICINE

## 2022-01-26 ASSESSMENT — PAIN SCALES - GENERAL: PAINLEVEL: NO PAIN (0)

## 2022-01-26 NOTE — PROGRESS NOTES
Assessment & Plan     Tinnitus, right ear  This was transient and lasted only for a couple of days  I suspect he might have some viral labyrinthitis  - Otolaryngology Referral; Future    Vertigo  Again this was very much transient  No hearing loss  Lasted for a few seconds  I think he probably had a viral labyrinthitis        20 minutes spent on the date of the encounter doing chart review, history and exam, documentation and further activities per the note           No follow-ups on file.    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      Objective    /65 (BP Location: Right arm, Patient Position: Sitting, Cuff Size: Adult Regular)   Pulse 66   Temp 97.9  F (36.6  C) (Oral)   Resp 18   Wt 63.7 kg (140 lb 8 oz)   SpO2 99%   BMI 20.16 kg/m    Body mass index is 20.16 kg/m .  Physical Exam   GENERAL: healthy, alert and no distress  EYES: Eyes grossly normal to inspection, PERRL and conjunctivae and sclerae normal  HENT: ear canals and TM's normal, nose and mouth without ulcers or lesions  NEURO: Normal strength and tone, mentation intact and speech normal  PSYCH: mentation appears normal, affect normal/bright

## 2022-01-26 NOTE — PATIENT INSTRUCTIONS
Patient Education     Inner Ear Problems: Causes of Dizziness (Vertigo)        Benign positional vertigo (BPV)   This is the most common cause of vertigo. BPV is also called benign positional paroxysmal vertigo (BPPV). It happens when crystals in the ear canals shift into the wrong place. Vertigo usually occurs when you move your head in a certain way. This can happen when turning in bed, bending, or looking up. Because BPV comes on quickly, you should think about if you are safe to drive or do other tasks that need your full attention.   BPV:    Causes vertigo that lasts for seconds. Vertigo can occur several times a day, depending on body position.    Doesn t cause hearing loss.    Often goes away on its own. But it may go away sooner with treatment.  Infection or inflammation  Sometimes the semicircular canals swell and send incorrect balance signals. This problem may be caused by a viral infection. Depending on the cause, your hearing can be affected (labyrinthitis). Or your hearing can remain normal (neuronitis).   Infection or inflammation:    Causes vertigo that lasts for hours or days. The first episode is usually the worst.    Can cause hearing loss.    Often goes away on its own. But it may go away sooner with treatment.  You may need vestibular rehabilitation if you have balance problems that don't go away.   Meniere s disease  This condition is uncommon. It happens when there is too much fluid in the ear canals. This causes increased pressure and swelling. It affects balance and hearing signals.   Meniere s disease may:    Cause vertigo that last for hours    Cause hearing problems that come and go. The problems are usually in one ear and get worse over time.    Cause buzzing or ringing in the ears (tinnitus)    Cause a feeling of fullness or pressure in the ear    Cause any of these lasting symptoms: vertigo, hearing loss, tinnitus, or ear fullness  Other causes of vertigo  Vertigo can also be caused  by:     A head injury    Certain medicines    Migraines    Brain problems, such as a stroke or bleeding in the brain    [employer] last reviewed this educational content on 2/1/2020 2000-2021 [employer]. All rights reserved. This information is not intended as a substitute for professional medical care. Always follow your healthcare professional's instructions.

## 2022-01-30 ENCOUNTER — HEALTH MAINTENANCE LETTER (OUTPATIENT)
Age: 32
End: 2022-01-30

## 2022-03-17 ENCOUNTER — ANCILLARY PROCEDURE (OUTPATIENT)
Dept: CT IMAGING | Facility: CLINIC | Age: 32
End: 2022-03-17
Attending: PHYSICIAN ASSISTANT
Payer: COMMERCIAL

## 2022-03-17 ENCOUNTER — OFFICE VISIT (OUTPATIENT)
Dept: FAMILY MEDICINE | Facility: CLINIC | Age: 32
End: 2022-03-17
Payer: COMMERCIAL

## 2022-03-17 VITALS
TEMPERATURE: 96 F | DIASTOLIC BLOOD PRESSURE: 72 MMHG | SYSTOLIC BLOOD PRESSURE: 117 MMHG | HEIGHT: 71 IN | OXYGEN SATURATION: 100 % | WEIGHT: 140 LBS | HEART RATE: 57 BPM | BODY MASS INDEX: 19.6 KG/M2 | RESPIRATION RATE: 16 BRPM

## 2022-03-17 DIAGNOSIS — R22.1 LUMP IN NECK: ICD-10-CM

## 2022-03-17 DIAGNOSIS — R22.1 LUMP IN NECK: Primary | ICD-10-CM

## 2022-03-17 LAB
BASOPHILS # BLD AUTO: 0 10E3/UL (ref 0–0.2)
BASOPHILS NFR BLD AUTO: 0 %
EOSINOPHIL # BLD AUTO: 0.3 10E3/UL (ref 0–0.7)
EOSINOPHIL NFR BLD AUTO: 7 %
ERYTHROCYTE [DISTWIDTH] IN BLOOD BY AUTOMATED COUNT: 13.3 % (ref 10–15)
HCT VFR BLD AUTO: 43.9 % (ref 40–53)
HGB BLD-MCNC: 15.3 G/DL (ref 13.3–17.7)
LYMPHOCYTES # BLD AUTO: 1.4 10E3/UL (ref 0.8–5.3)
LYMPHOCYTES NFR BLD AUTO: 34 %
MCH RBC QN AUTO: 29.8 PG (ref 26.5–33)
MCHC RBC AUTO-ENTMCNC: 34.9 G/DL (ref 31.5–36.5)
MCV RBC AUTO: 86 FL (ref 78–100)
MONOCYTES # BLD AUTO: 0.4 10E3/UL (ref 0–1.3)
MONOCYTES NFR BLD AUTO: 9 %
NEUTROPHILS # BLD AUTO: 2 10E3/UL (ref 1.6–8.3)
NEUTROPHILS NFR BLD AUTO: 50 %
PLATELET # BLD AUTO: 199 10E3/UL (ref 150–450)
RBC # BLD AUTO: 5.13 10E6/UL (ref 4.4–5.9)
WBC # BLD AUTO: 4.1 10E3/UL (ref 4–11)

## 2022-03-17 PROCEDURE — 36415 COLL VENOUS BLD VENIPUNCTURE: CPT | Performed by: PHYSICIAN ASSISTANT

## 2022-03-17 PROCEDURE — 85025 COMPLETE CBC W/AUTO DIFF WBC: CPT | Performed by: PHYSICIAN ASSISTANT

## 2022-03-17 PROCEDURE — 99213 OFFICE O/P EST LOW 20 MIN: CPT | Performed by: PHYSICIAN ASSISTANT

## 2022-03-17 PROCEDURE — 70491 CT SOFT TISSUE NECK W/DYE: CPT | Mod: GC | Performed by: STUDENT IN AN ORGANIZED HEALTH CARE EDUCATION/TRAINING PROGRAM

## 2022-03-17 RX ORDER — IOPAMIDOL 755 MG/ML
100 INJECTION, SOLUTION INTRAVASCULAR ONCE
Status: COMPLETED | OUTPATIENT
Start: 2022-03-17 | End: 2022-03-17

## 2022-03-17 RX ADMIN — IOPAMIDOL 100 ML: 755 INJECTION, SOLUTION INTRAVASCULAR at 17:42

## 2022-03-17 ASSESSMENT — PAIN SCALES - GENERAL: PAINLEVEL: NO PAIN (0)

## 2022-03-17 NOTE — RESULT ENCOUNTER NOTE
Mr. Duron,    All of your labs were normal/near normal for you.    Please contact the clinic if you have additional questions.  Thank you.    Sincerely,    Lennox Askew PA-C

## 2022-03-17 NOTE — PROGRESS NOTES
"  Assessment & Plan       ICD-10-CM    1. Lump in neck  R22.1 CT Soft Tissue Neck w Contrast     CBC with platelets and differential     CBC with platelets and differential   Talk to patient about his concerns. We will go ahead and order a soft tissue CT scan to assess these cystic type lesions.  He made comment that he would like them removed may have follow-up with plastics for second opinion.    Return in about 2 weeks (around 3/31/2022) for recheck, As Needed.    PAUL [facility], [place]    Femi Moss is a 31 year old who presents for the following health issues  accompanied by his self.  New patient.     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  Since then has had excessive scar tissue and in the last couple months has noticed 2 small lumps forming again.  He is very concerned about possible cancer.  He states there is no pain.  He denies any fevers chills body aches any weight changes or any night sweats.    Review of Systems   Constitutional, HEENT, cardiovascular, pulmonary, gi and gu systems are negative, except as otherwise noted.      Objective    /72   Pulse 57   Temp (!) 96  F (35.6  C) (Tympanic)   Resp 16   Ht 1.791 m (5' 10.5\")   Wt 63.5 kg (140 lb)   SpO2 100%   BMI 19.80 kg/m    Body mass index is 19.8 kg/m .  Physical Exam   GENERAL: healthy, alert and no distress  EYES: Eyes grossly normal to inspection, PERRL and conjunctivae and sclerae normal  HENT: ear canals and TM's normal, nose and mouth without ulcers " or lesions  NECK: no adenopathy, no asymmetry, masses, or scars and thyroid normal to palpation he has a large scar over his cricoid cartilage region.  He has 2 pea-sized cystic type mobile nontender lesions on the right side of his scar.  No signs of infection no other lymphadenopathy noted.    Results for orders placed or performed in visit on 03/17/22   CBC with platelets and differential     Status: None   Result Value Ref Range    WBC Count 4.1 4.0 - 11.0 10e3/uL    RBC Count 5.13 4.40 - 5.90 10e6/uL    Hemoglobin 15.3 13.3 - 17.7 g/dL    Hematocrit 43.9 40.0 - 53.0 %    MCV 86 78 - 100 fL    MCH 29.8 26.5 - 33.0 pg    MCHC 34.9 31.5 - 36.5 g/dL    RDW 13.3 10.0 - 15.0 %    Platelet Count 199 150 - 450 10e3/uL    % Neutrophils 50 %    % Lymphocytes 34 %    % Monocytes 9 %    % Eosinophils 7 %    % Basophils 0 %    Absolute Neutrophils 2.0 1.6 - 8.3 10e3/uL    Absolute Lymphocytes 1.4 0.8 - 5.3 10e3/uL    Absolute Monocytes 0.4 0.0 - 1.3 10e3/uL    Absolute Eosinophils 0.3 0.0 - 0.7 10e3/uL    Absolute Basophils 0.0 0.0 - 0.2 10e3/uL   CBC with platelets and differential     Status: None    Narrative    The following orders were created for panel order CBC with platelets and differential.  Procedure                               Abnormality         Status                     ---------                               -----------         ------                     CBC with platelets and d...[[id number]]                      Final result                 Please view results for these tests on the individual orders.

## 2022-03-18 NOTE — RESULT ENCOUNTER NOTE
Mr. Duron,    Your CT scan is normal.  No concerns for masses.  I suspect this is more scar tissue related and recommend you follow-up with the plastic surgeon or dermatologist to further look into this if you would like.    Please contact the clinic if you have additional questions.  Thank you.    Sincerely,    Lennox Askew PA-C

## 2022-09-24 ENCOUNTER — HEALTH MAINTENANCE LETTER (OUTPATIENT)
Age: 32
End: 2022-09-24

## 2023-05-08 ENCOUNTER — HEALTH MAINTENANCE LETTER (OUTPATIENT)
Age: 33
End: 2023-05-08

## 2024-07-14 ENCOUNTER — HEALTH MAINTENANCE LETTER (OUTPATIENT)
Age: 34
End: 2024-07-14